# Patient Record
Sex: FEMALE | Race: WHITE | NOT HISPANIC OR LATINO | URBAN - METROPOLITAN AREA
[De-identification: names, ages, dates, MRNs, and addresses within clinical notes are randomized per-mention and may not be internally consistent; named-entity substitution may affect disease eponyms.]

---

## 2022-11-15 ENCOUNTER — EMERGENCY (EMERGENCY)
Facility: HOSPITAL | Age: 62
LOS: 1 days | Discharge: HOME | End: 2022-11-15
Attending: EMERGENCY MEDICINE | Admitting: EMERGENCY MEDICINE

## 2022-11-15 VITALS
HEART RATE: 89 BPM | DIASTOLIC BLOOD PRESSURE: 77 MMHG | OXYGEN SATURATION: 97 % | RESPIRATION RATE: 16 BRPM | SYSTOLIC BLOOD PRESSURE: 123 MMHG | TEMPERATURE: 97 F

## 2022-11-15 PROCEDURE — 99284 EMERGENCY DEPT VISIT MOD MDM: CPT

## 2022-11-15 RX ORDER — CLONAZEPAM 1 MG
0.25 TABLET ORAL ONCE
Refills: 0 | Status: DISCONTINUED | OUTPATIENT
Start: 2022-11-15 | End: 2022-11-15

## 2022-11-15 RX ORDER — KETOROLAC TROMETHAMINE 30 MG/ML
15 SYRINGE (ML) INJECTION ONCE
Refills: 0 | Status: DISCONTINUED | OUTPATIENT
Start: 2022-11-15 | End: 2022-11-15

## 2022-11-15 RX ADMIN — Medication 15 MILLIGRAM(S): at 23:03

## 2022-11-15 RX ADMIN — Medication 0.25 MILLIGRAM(S): at 23:03

## 2022-11-15 NOTE — ED ADULT NURSE NOTE - [facility], MLM
Chart reviewed.   Requested updates from Care Everywhere.  Immunizations reconciled.   HM updated.    
Patent

## 2022-11-15 NOTE — ED PROVIDER NOTE - NSFOLLOWUPINSTRUCTIONS_ED_ALL_ED_FT
You appear to have bruised your sacrum and or the SI joint region in your fall.  You may also have caused a subtle change in alignment in the region.  I also suspect that some of the nerves in the area are either bruised causing the burning sensory symptoms or are being compressed by muscles that are spasming around him.      I am prescribing tramadol and Valium for severe pain and muscle spasm respectively.  Please take as little of these as possible to relieve the pain as they are both controlled substances.  Over-the-counter Motrin and Tylenol should be the baseline pain medications.  I will also send a Medrol Dosepak for you.  You can start this tomorrow evening if the pain has not subsided significantly.  Please consider following up with an osteopath or chiropractor to determine if you may need an adjustment.  If symptoms worsen or you develop new neurologic symptoms please come back to the emergency for reassessment.    Consider follow up with Dr. Caballero or Dr. Melvin at [facility]. They are osteopathic MDs who can do bone and tissue adjustments as well as set up high quality physical therapy.   [address], [place] [address]  Phone: [phone]      Consider follow up with Dr. Luis F Rodriguez (local Chiropractor)  [address]  [phone] You appear to have bruised your sacrum and or the SI joint region in your fall.  You may also have caused a subtle change in alignment in the region.  I also suspect that some of the nerves in the area are either bruised causing the burning sensory symptoms or are being compressed by muscles that are spasming around him.      I am prescribing Tramadol (or Percocet) and Valium for severe pain and muscle spasm respectively.  Please take as little of these as possible to relieve the pain as they are both controlled substances.  Over-the-counter Motrin and Tylenol should be the baseline pain medications.  I will also send a Medrol Dosepak for you.  You can start this tomorrow evening if the pain has not subsided significantly.  Please consider following up with an osteopath or chiropractor to determine if you may need an adjustment.  If symptoms worsen or you develop new neurologic symptoms please come back to the emergency for reassessment.    Consider follow up with Dr. Caballero or Dr. Melvin at [facility]. They are osteopathic MDs who can do bone and tissue adjustments as well as set up high quality physical therapy.   [address] [place] [address] [address]nd floor, [place] [address]  Phone: [phone]      Consider follow up with Dr. Luis F Rodriguez (local Chiropractor)  [address]  [phone]

## 2022-11-15 NOTE — ED PROVIDER NOTE - PATIENT PORTAL LINK FT
You can access the FollowMyHealth Patient Portal offered by [facility] by registering at the following website: [email or web address]. By joining [employer]’s FollowMyHealth portal, you will also be able to view your health information using other applications (apps) compatible with our system.

## 2022-11-15 NOTE — ED ADULT TRIAGE NOTE - CHIEF COMPLAINT QUOTE
Pt BIBA c/o R lower back pain s/p mechanical fall. [place]. Denies head injury or loc.

## 2022-11-15 NOTE — ED PROVIDER NOTE - PROGRESS NOTE DETAILS
Patient CT shows no acute findings.  She has persistent pain in the right SI joint region that is worse when she tries to raise her left or right leg.  So far she has been unable to get out of bed to walk.  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  Both her  and daughter are out of town currently and her friend is with her and has been helping.    We have been trying an escalating pattern of pain medications.  She had some partial relief with tramadol so Valium was added.  This gave some additional improvement so another round of tramadol and Valium were ordered.  This is in addition to ibuprofen and Toradol. Patient was able to stand up and walk to the bathroom and back with a cane.  Her friend will help her get home.  I will send a variety of medications to the pharmacy including a few Percocet and a Medrol Dosepak.  I stressed the importance of seeing an osteopath or chiropractor as I suspect that she will need an adjustment of her L-spine or sacrum.

## 2022-11-15 NOTE — ED PROVIDER NOTE - OBJECTIVE STATEMENT
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  Pt reports a slip and [place].  Fall 2/2 stairs being wet from current rain.  Pt denies any head injury or LOC.  Reports falling ~2-3 steps.  Edge of step struck her R SI joint.  Dneies any low back pain or hip pain.  Pain is isolated over R SI joint and does not radiate.  Associated with some muscle spasms on that side.  Denies any LE numbness or weakness.  She is able to move her legs normally but couldn't walk after fall 2/2 pain.  Pain is burning in quality and severe, 9/10.

## 2022-11-15 NOTE — ED PROVIDER NOTE - CLINICAL SUMMARY MEDICAL DECISION MAKING FREE TEXT BOX
R/O pelvis fracture.  Will CT given h/o SI OA and to ensure no missed fracture.  We will treat pain with NSAID and muscle relaxant.  Offered valium but pt reports she uses 0.25mg of klonopin for her intermittent anxiety and would prefer that medication with that dose as she is very sensitive to benzos.

## 2022-11-15 NOTE — ED ADULT NURSE NOTE - OBJECTIVE STATEMENT
Pt presenting s/p mechanical trip and fall down subway steps. Endorsing lower R hip/back pain with movement. Sensation intact, REAGAN x4. 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

## 2022-11-15 NOTE — ED PROVIDER NOTE - PHYSICAL EXAMINATION
VITAL SIGNS: I have reviewed nursing notes and confirm.  CONSTITUTIONAL: Well-developed; well-nourished; in no apparent distress.  SKIN: Skin is warm and dry, no acute rash.  HEAD: Normocephalic; atraumatic.  EYES: PERRL, EOM intact; conjunctiva and sclera clear.  ENT: No nasal discharge; airway clear.  NECK: Supple; non tender.  CARD: S1, S2 normal; no murmurs, gallops, or rubs. Regular rate and rhythm.  RESP: No wheezes, rales or rhonchi.  ABD: Normal bowel sounds; soft; non-distended; non-tender; no hepatosplenomegaly.  MSK: Normal ROM. +TTP over R SI joint, no midline TTP over lumbar spine.  Hips have full ROM b/l.  Negative straight leg raise.  NVID to both legs.   NEURO: Alert, oriented. Grossly unremarkable.  PSYCH: Cooperative, appropriate.

## 2022-11-16 VITALS
HEART RATE: 61 BPM | DIASTOLIC BLOOD PRESSURE: 83 MMHG | TEMPERATURE: 98 F | OXYGEN SATURATION: 97 % | SYSTOLIC BLOOD PRESSURE: 123 MMHG | RESPIRATION RATE: 18 BRPM

## 2022-11-16 PROCEDURE — 72192 CT PELVIS W/O DYE: CPT | Mod: 26

## 2022-11-16 RX ORDER — DEXAMETHASONE 0.5 MG/5ML
10 ELIXIR ORAL ONCE
Refills: 0 | Status: COMPLETED | OUTPATIENT
Start: 2022-11-16 | End: 2022-11-16

## 2022-11-16 RX ORDER — TRAMADOL HYDROCHLORIDE 50 MG/1
50 TABLET ORAL ONCE
Refills: 0 | Status: DISCONTINUED | OUTPATIENT
Start: 2022-11-16 | End: 2022-11-16

## 2022-11-16 RX ORDER — DIAZEPAM 5 MG
5 TABLET ORAL ONCE
Refills: 0 | Status: DISCONTINUED | OUTPATIENT
Start: 2022-11-16 | End: 2022-11-16

## 2022-11-16 RX ORDER — DIAZEPAM 5 MG
2 TABLET ORAL
Qty: 20 | Refills: 0
Start: 2022-11-16 | End: 2022-11-20

## 2022-11-16 RX ORDER — IBUPROFEN 200 MG
600 TABLET ORAL ONCE
Refills: 0 | Status: COMPLETED | OUTPATIENT
Start: 2022-11-16 | End: 2022-11-16

## 2022-11-16 RX ORDER — ACETAMINOPHEN 500 MG
975 TABLET ORAL ONCE
Refills: 0 | Status: COMPLETED | OUTPATIENT
Start: 2022-11-16 | End: 2022-11-16

## 2022-11-16 RX ORDER — DIAZEPAM 5 MG
10 TABLET ORAL ONCE
Refills: 0 | Status: DISCONTINUED | OUTPATIENT
Start: 2022-11-16 | End: 2022-11-16

## 2022-11-16 RX ORDER — OXYCODONE AND ACETAMINOPHEN 5; 325 MG/1; MG/1
1 TABLET ORAL ONCE
Refills: 0 | Status: DISCONTINUED | OUTPATIENT
Start: 2022-11-16 | End: 2022-11-16

## 2022-11-16 RX ORDER — OXYCODONE HYDROCHLORIDE 5 MG/1
1 TABLET ORAL
Qty: 12 | Refills: 0
Start: 2022-11-16 | End: 2022-11-18

## 2022-11-16 RX ORDER — IBUPROFEN 200 MG
1 TABLET ORAL
Qty: 30 | Refills: 0
Start: 2022-11-16 | End: 2022-11-22

## 2022-11-16 RX ORDER — TRAMADOL HYDROCHLORIDE 50 MG/1
1 TABLET ORAL
Qty: 20 | Refills: 0
Start: 2022-11-16 | End: 2022-11-20

## 2022-11-16 RX ADMIN — Medication 5 MILLIGRAM(S): at 07:49

## 2022-11-16 RX ADMIN — Medication 975 MILLIGRAM(S): at 03:59

## 2022-11-16 RX ADMIN — Medication 975 MILLIGRAM(S): at 09:01

## 2022-11-16 RX ADMIN — TRAMADOL HYDROCHLORIDE 50 MILLIGRAM(S): 50 TABLET ORAL at 05:26

## 2022-11-16 RX ADMIN — Medication 10 MILLIGRAM(S): at 05:39

## 2022-11-16 RX ADMIN — Medication 5 MILLIGRAM(S): at 05:39

## 2022-11-16 RX ADMIN — Medication 15 MILLIGRAM(S): at 03:41

## 2022-11-16 RX ADMIN — Medication 975 MILLIGRAM(S): at 05:26

## 2022-11-16 RX ADMIN — TRAMADOL HYDROCHLORIDE 50 MILLIGRAM(S): 50 TABLET ORAL at 07:49

## 2022-11-16 RX ADMIN — TRAMADOL HYDROCHLORIDE 50 MILLIGRAM(S): 50 TABLET ORAL at 03:59

## 2022-11-16 RX ADMIN — Medication 600 MILLIGRAM(S): at 09:01

## 2022-11-16 NOTE — ED ADULT NURSE REASSESSMENT NOTE - NS ED NURSE REASSESS COMMENT FT1
pt endorsed to me in NAD, c/o right lower back pain upon movement. pain medication administered
Patient with improvement in pain and sx as she is now able to sit up and stand but continues to have tenderness to the right hip.

## 2022-11-18 DIAGNOSIS — M62.838 OTHER MUSCLE SPASM: ICD-10-CM
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
## 2022-11-18 DIAGNOSIS — F43.10 POST-TRAUMATIC STRESS DISORDER, UNSPECIFIED: ICD-10-CM
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
## 2022-11-18 DIAGNOSIS — S30.0XXA CONTUSION OF LOWER BACK AND PELVIS, INITIAL ENCOUNTER: ICD-10-CM

## 2022-11-18 DIAGNOSIS — Z88.0 ALLERGY STATUS TO PENICILLIN: ICD-10-CM

## 2022-11-18 DIAGNOSIS — Z88.2 ALLERGY STATUS TO SULFONAMIDES: ICD-10-CM

## 2022-11-18 DIAGNOSIS — Z88.1 ALLERGY STATUS TO OTHER ANTIBIOTIC AGENTS STATUS: ICD-10-CM

## 2022-11-18 DIAGNOSIS — M19.90 UNSPECIFIED OSTEOARTHRITIS, UNSPECIFIED SITE: ICD-10-CM
